# Patient Record
Sex: FEMALE | Race: BLACK OR AFRICAN AMERICAN | NOT HISPANIC OR LATINO | Employment: FULL TIME | URBAN - METROPOLITAN AREA
[De-identification: names, ages, dates, MRNs, and addresses within clinical notes are randomized per-mention and may not be internally consistent; named-entity substitution may affect disease eponyms.]

---

## 2023-08-04 LAB
CLUE CELLS: ABNORMAL
NUGENT SCORE: 1
YEAST: PRESENT

## 2023-09-01 LAB
CLUE CELLS: ABNORMAL
NUGENT SCORE: 0
TRICHOMONAS VAGINALIS: NEGATIVE
YEAST: PRESENT

## 2023-10-20 DIAGNOSIS — B37.9 YEAST INFECTION: ICD-10-CM

## 2023-10-20 RX ORDER — TERCONAZOLE 8 MG/G
1 CREAM VAGINAL NIGHTLY
Qty: 20 G | Refills: 0 | Status: SHIPPED | OUTPATIENT
Start: 2023-10-20 | End: 2023-12-15 | Stop reason: WASHOUT

## 2023-10-20 RX ORDER — TERCONAZOLE 8 MG/G
1 CREAM VAGINAL NIGHTLY
COMMUNITY
Start: 2023-08-04 | End: 2023-10-20 | Stop reason: SDUPTHER

## 2023-10-20 NOTE — TELEPHONE ENCOUNTER
Patient called requesting refill on Terconazole.     LRA 8-3-23 with PBU    Pharmacy [facility]

## 2023-12-11 ASSESSMENT — ENCOUNTER SYMPTOMS
NAUSEA: 0
HEADACHES: 0
CHILLS: 0
CONSTIPATION: 0
BACK PAIN: 0
DIARRHEA: 0
FREQUENCY: 0
DYSURIA: 0
FLANK PAIN: 0
ABDOMINAL PAIN: 0
ANOREXIA: 0
SORE THROAT: 0
VOMITING: 0
HEMATURIA: 0
FEVER: 0

## 2023-12-13 NOTE — PROGRESS NOTES
HPI    - PT HAS VAGINAL ITCHING, DISCHARGE - WHITE - USED TERCONAZOLE CREAM BUT DID NOT HELP  Last edited by Juan Morales MA on 12/15/2023  2:50 PM.        Patient treated for yeast with Diflucan in August and symptoms came back. Then did Terconazol and symptoms came back again. Did the cream 3-4 weeks ago. Came back a week after. Comes back when sexually active. Currently feels itching internal and external, discharge, white, clumpy.     Subjective   Patient ID: Bridgett Ag is a 33 y.o. female who presents for Vaginitis/Bacterial Vaginosis.      Review of Systems   Constitutional:  Negative for chills and fever.   HENT:  Negative for sore throat.    Gastrointestinal:  Negative for abdominal pain, constipation, diarrhea, nausea and vomiting.   Genitourinary:  Positive for urgency and vaginal discharge. Negative for dysuria, flank pain, frequency, hematuria and pelvic pain.   Musculoskeletal:  Negative for back pain.   Skin:  Negative for rash.   Neurological:  Negative for headaches.       Objective   Constitutional: Alert and in no acute distress. Well developed, well nourished.  Abdomen: soft nontender; no abdominal mass palpated, no organomegaly and no hernias.  Genitourinary: external genitalia: erythema around introitus, no inguinal lymphadenopathy, Bartholin's urethral and Chetek's glands: normal, urethra: normal and bladder: normal on palpation.  Vagina: white discharge.  Cervix: normal.  Uterus: normal.   Right adnexa/parametria: normal.  Left adnexa/parametria: normal.  Psychiatric: alert and oriented x 3, affect normal to patient baseline and mood appropriate.     Assessment/Plan   -MDL culture to specify type of yeast.  Will treat with Diflucan 150 mg and repeat in 3 days.  Vulvar handout given.  Check HBGAIC.

## 2023-12-15 ENCOUNTER — OFFICE VISIT (OUTPATIENT)
Dept: OBSTETRICS AND GYNECOLOGY | Facility: CLINIC | Age: 33
End: 2023-12-15
Payer: COMMERCIAL

## 2023-12-15 ENCOUNTER — LAB (OUTPATIENT)
Dept: LAB | Facility: LAB | Age: 33
End: 2023-12-15
Payer: COMMERCIAL

## 2023-12-15 VITALS
SYSTOLIC BLOOD PRESSURE: 128 MMHG | WEIGHT: 228 LBS | HEIGHT: 64 IN | BODY MASS INDEX: 38.93 KG/M2 | DIASTOLIC BLOOD PRESSURE: 80 MMHG

## 2023-12-15 DIAGNOSIS — B37.9 YEAST INFECTION: Primary | ICD-10-CM

## 2023-12-15 DIAGNOSIS — B37.9 YEAST INFECTION: ICD-10-CM

## 2023-12-15 LAB
EST. AVERAGE GLUCOSE BLD GHB EST-MCNC: 128 MG/DL
HBA1C MFR BLD: 6.1 %

## 2023-12-15 PROCEDURE — 1036F TOBACCO NON-USER: CPT | Performed by: OBSTETRICS & GYNECOLOGY

## 2023-12-15 PROCEDURE — 36415 COLL VENOUS BLD VENIPUNCTURE: CPT

## 2023-12-15 PROCEDURE — 83036 HEMOGLOBIN GLYCOSYLATED A1C: CPT

## 2023-12-15 PROCEDURE — 99213 OFFICE O/P EST LOW 20 MIN: CPT | Performed by: OBSTETRICS & GYNECOLOGY

## 2023-12-15 RX ORDER — FLUCONAZOLE 150 MG/1
150 TABLET ORAL ONCE
Qty: 2 TABLET | Refills: 0 | Status: SHIPPED | OUTPATIENT
Start: 2023-12-15 | End: 2023-12-15

## 2023-12-15 ASSESSMENT — ENCOUNTER SYMPTOMS
DYSURIA: 0
VOMITING: 0
FLANK PAIN: 0
ABDOMINAL PAIN: 0
BACK PAIN: 0
HEADACHES: 0
SORE THROAT: 0
DIARRHEA: 0
FREQUENCY: 0
FEVER: 0
HEMATURIA: 0
CONSTIPATION: 0
NAUSEA: 0
CHILLS: 0

## 2023-12-19 ENCOUNTER — TELEPHONE (OUTPATIENT)
Dept: OBSTETRICS AND GYNECOLOGY | Facility: CLINIC | Age: 33
End: 2023-12-19
Payer: COMMERCIAL

## 2023-12-20 NOTE — TELEPHONE ENCOUNTER
Patient called about increasing HGBAIC and recommend she follow up with a PCP. She is working on getting a new one.

## 2023-12-22 DIAGNOSIS — B96.89 BACTERIAL VAGINOSIS: Primary | ICD-10-CM

## 2023-12-22 DIAGNOSIS — B37.9 YEAST INFECTION: ICD-10-CM

## 2023-12-22 DIAGNOSIS — N76.0 BACTERIAL VAGINOSIS: Primary | ICD-10-CM

## 2023-12-22 RX ORDER — FLUCONAZOLE 150 MG/1
150 TABLET ORAL ONCE
Qty: 2 TABLET | Refills: 0 | Status: SHIPPED | OUTPATIENT
Start: 2023-12-22 | End: 2023-12-22

## 2023-12-22 RX ORDER — METRONIDAZOLE 7.5 MG/G
GEL VAGINAL DAILY
Qty: 70 G | Refills: 0 | Status: SHIPPED | OUTPATIENT
Start: 2023-12-22 | End: 2023-12-27

## 2024-04-08 ENCOUNTER — HOSPITAL ENCOUNTER (EMERGENCY)
Facility: HOSPITAL | Age: 34
Discharge: HOME | End: 2024-04-08
Payer: COMMERCIAL

## 2024-04-08 ENCOUNTER — APPOINTMENT (OUTPATIENT)
Dept: RADIOLOGY | Facility: HOSPITAL | Age: 34
End: 2024-04-08
Payer: COMMERCIAL

## 2024-04-08 VITALS
HEART RATE: 74 BPM | TEMPERATURE: 97.8 F | DIASTOLIC BLOOD PRESSURE: 93 MMHG | WEIGHT: 214 LBS | HEIGHT: 65 IN | BODY MASS INDEX: 35.65 KG/M2 | SYSTOLIC BLOOD PRESSURE: 140 MMHG | OXYGEN SATURATION: 98 % | RESPIRATION RATE: 18 BRPM

## 2024-04-08 DIAGNOSIS — M54.12 CERVICAL RADICULOPATHY: ICD-10-CM

## 2024-04-08 DIAGNOSIS — M25.511 ACUTE PAIN OF RIGHT SHOULDER: Primary | ICD-10-CM

## 2024-04-08 PROCEDURE — 73030 X-RAY EXAM OF SHOULDER: CPT | Mod: RIGHT SIDE | Performed by: RADIOLOGY

## 2024-04-08 PROCEDURE — 99283 EMERGENCY DEPT VISIT LOW MDM: CPT

## 2024-04-08 PROCEDURE — 73030 X-RAY EXAM OF SHOULDER: CPT | Mod: RT

## 2024-04-08 PROCEDURE — 2500000004 HC RX 250 GENERAL PHARMACY W/ HCPCS (ALT 636 FOR OP/ED): Performed by: PHYSICIAN ASSISTANT

## 2024-04-08 PROCEDURE — 96372 THER/PROPH/DIAG INJ SC/IM: CPT | Performed by: PHYSICIAN ASSISTANT

## 2024-04-08 RX ORDER — CYCLOBENZAPRINE HCL 10 MG
10 TABLET ORAL 3 TIMES DAILY PRN
Qty: 15 TABLET | Refills: 0 | Status: SHIPPED | OUTPATIENT
Start: 2024-04-08

## 2024-04-08 RX ORDER — NAPROXEN 500 MG/1
500 TABLET ORAL
Qty: 30 TABLET | Refills: 0 | Status: SHIPPED | OUTPATIENT
Start: 2024-04-08 | End: 2024-04-23

## 2024-04-08 RX ORDER — KETOROLAC TROMETHAMINE 30 MG/ML
30 INJECTION, SOLUTION INTRAMUSCULAR; INTRAVENOUS ONCE
Status: COMPLETED | OUTPATIENT
Start: 2024-04-08 | End: 2024-04-08

## 2024-04-08 RX ADMIN — KETOROLAC TROMETHAMINE 30 MG: 30 INJECTION, SOLUTION INTRAMUSCULAR at 17:34

## 2024-04-08 ASSESSMENT — PAIN SCALES - GENERAL: PAINLEVEL_OUTOF10: 8

## 2024-04-08 ASSESSMENT — PAIN DESCRIPTION - ORIENTATION: ORIENTATION: RIGHT

## 2024-04-08 ASSESSMENT — COLUMBIA-SUICIDE SEVERITY RATING SCALE - C-SSRS
2. HAVE YOU ACTUALLY HAD ANY THOUGHTS OF KILLING YOURSELF?: NO
6. HAVE YOU EVER DONE ANYTHING, STARTED TO DO ANYTHING, OR PREPARED TO DO ANYTHING TO END YOUR LIFE?: NO
1. IN THE PAST MONTH, HAVE YOU WISHED YOU WERE DEAD OR WISHED YOU COULD GO TO SLEEP AND NOT WAKE UP?: NO

## 2024-04-08 ASSESSMENT — PAIN DESCRIPTION - LOCATION: LOCATION: SHOULDER

## 2024-04-08 ASSESSMENT — PAIN - FUNCTIONAL ASSESSMENT: PAIN_FUNCTIONAL_ASSESSMENT: 0-10

## 2024-04-08 NOTE — ED TRIAGE NOTES
C/O RIGHT SHOULDER PAIN, UNKNOWN INJURY, HX OF DISLOCATION, DENIES CP/SOB/PALPITATIONS/N/V/D/F/CHILLS, INCREASED PAIN WITH ROM

## 2024-04-08 NOTE — ED PROVIDER NOTES
HPI   Chief Complaint   Patient presents with    Arm Pain    Arm Injury     RIGHT       Is a 33-year-old female with pain in her right shoulder also has some pain from her neck down towards her fingers this has been ongoing for the past day she is concerned about a dislocation of the right shoulder since she has dislocated her shoulder in the past however no injury no lifting turning or bending.  She is still able to shrug her shoulders.  She denies other injury nothing make her better or worse                          No data recorded                   Patient History   Past Medical History:   Diagnosis Date    Encounter for screening for malignant neoplasm of cervix 02/22/2021    Cervical cancer screening    Vitamin D deficiency, unspecified 01/12/2021    Vitamin D deficiency     Past Surgical History:   Procedure Laterality Date    OTHER SURGICAL HISTORY  03/01/2022    Loop electrosurgical excision procedure    OTHER SURGICAL HISTORY  03/01/2022    Colposcopy     Family History   Problem Relation Name Age of Onset    Hypertension Mother      Diabetes Mother      Blood clot Father      Heart disease Father      Diabetes Father       Social History     Tobacco Use    Smoking status: Never    Smokeless tobacco: Never   Substance Use Topics    Alcohol use: Never    Drug use: Never       Physical Exam   ED Triage Vitals [04/08/24 1738]   Temperature Heart Rate Respirations BP   36.6 °C (97.8 °F) 74 18 (!) 140/93      Pulse Ox Temp src Heart Rate Source Patient Position   98 % -- -- --      BP Location FiO2 (%)     -- --       Physical Exam  Vitals reviewed.   Constitutional:       General: She is not in acute distress.     Appearance: Normal appearance. She is normal weight. She is not ill-appearing, toxic-appearing or diaphoretic.   HENT:      Head: Normocephalic and atraumatic.      Right Ear: External ear normal.      Left Ear: External ear normal.      Nose: Nose normal.      Mouth/Throat:      Mouth: Mucous  membranes are moist.   Eyes:      Extraocular Movements: Extraocular movements intact.      Conjunctiva/sclera: Conjunctivae normal.      Pupils: Pupils are equal, round, and reactive to light.   Pulmonary:      Effort: Pulmonary effort is normal. No respiratory distress.      Breath sounds: No stridor.   Abdominal:      General: There is no distension.      Tenderness: There is no guarding or rebound.   Musculoskeletal:         General: Tenderness present. No swelling or deformity.      Cervical back: Normal range of motion.      Comments: Tenderness in the right shoulder region.   Skin:     Capillary Refill: Capillary refill takes less than 2 seconds.      Findings: No rash.   Neurological:      General: No focal deficit present.      Mental Status: She is alert and oriented to person, place, and time. Mental status is at baseline.      Sensory: No sensory deficit.      Motor: No weakness.      Coordination: Coordination normal.   Psychiatric:         Mood and Affect: Mood normal.         Behavior: Behavior normal.         Thought Content: Thought content normal.         Judgment: Judgment normal.         ED Course & MDM   Diagnoses as of 04/08/24 1813   Acute pain of right shoulder   Cervical radiculopathy       Medical Decision Making  Differential is sprain versus strain vs dislocation (clinically do not suspect)     Ultimately patient is discharged home x-rays unremarkable for fx or dislocation    Referred to ortho discharged.        Procedure  Procedures     Stalin Murcia PA-C  04/08/24 1818

## 2024-04-22 ENCOUNTER — PROCEDURE VISIT (OUTPATIENT)
Dept: ORTHOPEDIC SURGERY | Facility: CLINIC | Age: 34
End: 2024-04-22
Payer: COMMERCIAL

## 2024-04-22 DIAGNOSIS — M77.8 TENDINITIS OF RIGHT SHOULDER: ICD-10-CM

## 2024-04-22 PROCEDURE — 99213 OFFICE O/P EST LOW 20 MIN: CPT | Performed by: ORTHOPAEDIC SURGERY

## 2024-04-22 PROCEDURE — 99203 OFFICE O/P NEW LOW 30 MIN: CPT | Performed by: ORTHOPAEDIC SURGERY

## 2024-04-22 ASSESSMENT — PAIN SCALES - GENERAL: PAINLEVEL_OUTOF10: 8

## 2024-04-22 ASSESSMENT — PAIN - FUNCTIONAL ASSESSMENT: PAIN_FUNCTIONAL_ASSESSMENT: 0-10

## 2024-04-22 NOTE — PROGRESS NOTES
2-week history right shoulder pain no injury but better went to ER placed in sling also has developed some lower back pain  X-rays are negative recommend physical therapy for shoulder and back

## 2024-06-03 ENCOUNTER — EVALUATION (OUTPATIENT)
Dept: PHYSICAL THERAPY | Facility: HOSPITAL | Age: 34
End: 2024-06-03
Payer: COMMERCIAL

## 2024-06-03 DIAGNOSIS — M77.8 TENDINITIS OF RIGHT SHOULDER: ICD-10-CM

## 2024-06-03 PROCEDURE — 97110 THERAPEUTIC EXERCISES: CPT | Mod: GP | Performed by: PHYSICAL THERAPIST

## 2024-06-03 PROCEDURE — 97161 PT EVAL LOW COMPLEX 20 MIN: CPT | Mod: GP | Performed by: PHYSICAL THERAPIST

## 2024-06-03 NOTE — PROGRESS NOTES
Initial evaluation  Physical Therapy Initial Evaluation    Patient Name:Bridgett Ag  MRN:[id number]  Today's Date:6/3/2024  Referred by: Saqib Kelly  Time Calculation  Start Time: 1637  Stop Time: 1710  Time Calculation (min): 33 min    Therapy Diagnosis  1. Tendinitis of right shoulder         Plan of Care  Planned interventions include PRN: therapeutic exercise, manual therapy, electrical stimulation, hot pack, vasopneumatic device with cold, HEP training.   Frequency and duration: 1 time(s) a week, for  6-8 weeks or 8 visits .   Plan of care was developed with input and agreement by the patient.   Plan for next session: review HEP, begin scapular stabilization and continue posture re-education, cervical spine stretching to be added next visit    Assessment  Problem List: Pain, range of motion/joint mobility, strength, activity limitations, ADLs/IADLs/self care skills, decreased functional level, decreased knowledge of HEP, flexibility, and posture.     Patient is a 34 y.o. female who presents with complaint of R shoulder pain . Standardized testing and measures administered today reveal that the patient has multiple impairments in body structures and functions, activity limitations, and participation restrictions. These include subjective and objective findings such as pain, tenderness to palpation of the affected area, decreased ROM, strength, flexibility, and function. The patient's impairments are likely influenced by mechanical dysfunction and deconditioning with possible overuse and degenerative changes. Skilled PT services are warranted in order to realize measurable and meaningful change in the above outcome measures and achieve improvements in the patient's functional status and individual goals.    Rehab Potential:good  Clinical Presentation: Stable and/or uncomplicated characteristics.   Evaluation Complexity: Low      Precautions/Fall Risk:none* Pacemaker no  Seizures No  Post Op  Movement/Restrictions No    Insurance  Visit number: 1   Approved number of visits: MN  Onset Date: 2024  Certification Period:  Beginnin/3/2024             Payor: [employer] / Plan: HIGHMARK / Product Type: *No Product type* /     Subjective  Chief complaint/reason for visit: Patient is a 34 year old female with insidious onset R shoulder pain starting after waking up in 2024. She has no prior shoulder complaints/injuries. She is RHD. She has had x-rays. Nothing really relieves or worsens her pain except sleeping, sometimes typing, and OH motions.  Mechanism of Injury:  unknown and slept funny  Location of Pain: Anterior/lateral shoulder  Current Pain Level (0-10): 2   High Pain Level (0-10): 5   Low Pain Level (0-10): 0  Pain Quality: aching, dull, and sharp  Pain Exacerbating Factors: resting, lying down, typing at work  Pain Relieving Factors: heat, medications prescribed pain medications, and position change    Medical Screening: Reviewed medical history form with patient and medical screening assessed.   Red Flags: Do you have any of the following? No  Fever/chills, unexplained weight changes, dizziness/fainting, unexplained change in bowel or bladder functions, unexplained malaise or muscle weakness, night pain/sweats, numbness or tingling  Current Medical Management: Medical, X-rays  Prior Level of Function (PLOF)  Patient previously independent with all ADLs  Exercise/Physical Activity: None  Functional limitations: decreased positional tolerances with sleeping,reaching OH to end range, self-care activities, work related tasks (typing), push/pull activities, participation in home management/duties, participation in leisure activities and athletics.  Work Status: full time job doing [employer] seated job  Current Status: improved slightly  Patient Awareness: Patient is aware of  her diagnosis and prognosis.   Living Environment: house  Social Support: spouse / significant other  Personal Factors That  May Impact Care: none  Patient's Goal for Treatment: relieving pain, increasing strength, increasing mobility, improving positional tolerances, reducing symptoms, returning to work symptom free,  and resuming physical activities .     Objective  Other Measures  Disability of Arm Shoulder Hand (DASH): 15.9%     Upper Extremity Objective:  Observation/Posture: FHRS posture, patient is obese, large busted  Palpation: No TTP  AROM (tested in supine):   R/L shoulder flexion AROM (degrees): WFL pain at end range  R/L shoulder extension AROM (degrees): WFL  R/L shoulder abduction AROM (degrees): 160 degpainful arc and end range pain  R/L shoulder ER AROM (degrees): WFL  R/L shoulder IR AROM (degrees): WFL  R/L elbow AROM (degrees): WFL  MMT:   R/L shoulder flexion strength (MMT): 4/5 with pain above elbow joint // 4+/5  R/L shoulder abduction strength (MMT): 4+/5  R/L shoulder ER strength (MMT): 4+/5  R/L shoulder IR strength (MMT): 4+/5  R/L elbow flex/ext (MMT): 5/5    Special Tests: (+) Love, (+) Painful Arc, (+) Neer's, (-) Drop Arm, FIR WNL    Treatment Performed Today: Initial evaluation and patient education regarding diagnosis, prognosis, contributing factors, comorbidities, importance and instruction of HEP, role of PT, postural re-education, activity modification, and body mechanics.    Therapeutic Exercise 10 minutes  Education/Resources provided today: Home Program   [email or web address]: Provided, reviewed, and performed the following therapeutic exercises with the patient:  Access Code: [email or web address]  URL: [email or web address]  Date: 06/03/2024  Prepared by: Sana Mims    Exercises  - Seated Scapular Retraction  - 1 x daily - 7 x weekly - 3 sets - 10 reps - 3 hold  - Doorway Pec Stretch at 90 Degrees Abduction  - 1 x daily - 7 x weekly - 3 reps - 30 hold  - Sidelying Open Book Thoracic Lumbar Rotation and Extension  - 1 x daily - 7 x weekly - 10 reps - 10 hold  - Open Book Chest Stretch on  Towel Roll  - 1 x daily - 7 x weekly - 10 reps - 10 hold    Response to Treatment: improved knowledge and understanding of condition, decreased pain, improved joint mobility/ROM, improved strength, improved flexibility, improved tissue mobility, improved posture.    Goals: Goals set and discussed today.   Upper Extremity Goals  At time of discharge, the patient will:  1) Perform HEP independently within the clinic to show adherence to initial program for symptom management.  2) Patient will report a reduction in pain at its worst from 8/10 to 0/10 to allow patient to perform essential ADLs and iADLs at [facility].  3) Demonstrate proper scapular position and posture for improved positional tolerances to sitting and standing > 2 hrs symptom free while typing at work  4) Decrease score of Quick Dash by > 8 points to meet MCID, demonstrating a clinically significant improvement in function.  5) Patient will tolerate laying on affected shoulder with unrestricted sleep for 6-8 hours.  8) Patient will demonstrate functional strength of the affected shoulder to perform lift of 8-12 pounds for groceries and laundry duties.     Patient stated goal:

## 2024-06-24 ENCOUNTER — TREATMENT (OUTPATIENT)
Dept: PHYSICAL THERAPY | Facility: HOSPITAL | Age: 34
End: 2024-06-24
Payer: COMMERCIAL

## 2024-06-24 DIAGNOSIS — M77.8 TENDINITIS OF RIGHT SHOULDER: ICD-10-CM

## 2024-06-24 PROCEDURE — 97110 THERAPEUTIC EXERCISES: CPT | Mod: GP | Performed by: PHYSICAL THERAPIST

## 2024-06-24 NOTE — PROGRESS NOTES
"Treatment note  Physical Therapy Treatment  Patient Name:Bridgett Ag  MRN:[id number]  Today's Date:2024  Referred by: Saqib Kelyl  Time Calculation  Start Time: 1655  Stop Time: 1740  Time Calculation (min): 45 min    Therapy Diagnosis  1. Tendinitis of right shoulder         Assessment: Patient requires visual and verbal cuing for instruction of exercises. She has no complaints of pain during or following exercises. Reviewed HEP and progress scapular strengthening and T/S mobility today. Focus of treatment on scapular strengthening and posture.    Plan: Follow up 1 visit  in 2-3 weeks and plan for early discharge to independent HEP if patient remains pain free    Insurance  Visit number: 2  Approved number of visits: MN  Onset Date: 2024  Certification Period:  Beginnin/3/2024    Ending:     Precautions/Fall Risk:none* Pacemaker no  Seizures No  Post Op Movement/Restrictions No    Subjective/Pain: Patient reports only hurting \"now and then\" since initial evaluation stating 80% of the time she's been pain free since last seen. She reports some compliance with HEP.  Current Pain Level (0-10): 0    HEP Compliance: Fair    Objective/Outcome Measures:  Posture: FHRS posture  R shoulder AROM WNL    Treatment  Therapeutic Exercise 45 minutes  UE ergo: 3'/3'  Pec s: 1' x 2   Wall walks 3\" holds x 10   TB rows: Green: 3 x10   Jobes: 1# 2 x 10   T/S rotation: 10\" x 10   SL 4 way 2# x 20 ea  TB \"No moneys\" green: 3 x 10  TB green Chest pulls: 2 x 10     Goals:  Upper Extremity Goals  At time of discharge, the patient will:  1) Perform HEP independently within the clinic to show adherence to initial program for symptom management.  2) Patient will report a reduction in pain at its worst from 8/10 to 0/10 to allow patient to perform essential ADLs and iADLs at [facility].  3) Demonstrate proper scapular position and posture for improved positional tolerances to sitting and standing > 2 hrs symptom free " while typing at work  4) Decrease score of Quick Dash by > 8 points to meet MCID, demonstrating a clinically significant improvement in function.  5) Patient will tolerate laying on affected shoulder with unrestricted sleep for 6-8 hours.  8) Patient will demonstrate functional strength of the affected shoulder to perform lift of 8-12 pounds for groceries and laundry duties.     Patient stated goal: return to PLOF

## 2024-07-01 ENCOUNTER — APPOINTMENT (OUTPATIENT)
Dept: PHYSICAL THERAPY | Facility: HOSPITAL | Age: 34
End: 2024-07-01
Payer: COMMERCIAL

## 2024-08-18 NOTE — PROGRESS NOTES
Subjective   Patient ID: Bridgett Ag is a 34 y.o. female who presents for Annual Exam.    PAP 8-3-23 NEG NEG; 7-25-22 ASCUS, HPV POS 18 - Had OB colpo. Gardasil done. Has had colposcopies in past.  MAMMO NEVER  DEXA NEVER  COLON NEVER  Gardasil yes     No yeast infections past year.     20 week loss 2022. Had f/u with Dr. Nguyen. MFM consult next pregnancy and 12-13 week cerclage. Trying for pregnancy over a year. Taking folic acid. Trouble getting pregnant the first time. Saw [facility]. Got pregnant on own. He did a normal SA.      Regular cycles. Every 28-29 days.    Bought a house with renovations. Lots of stress with it.      Review of Systems   All other systems reviewed and are negative.      Objective   Constitutional: Alert and in no acute distress. Well developed, well nourished.  Neck: no neck asymmetry. Supple and thyroid not enlarged and there were no palpable thyroid nodules.  Chest: Breasts normal appearance, no nipple discharge and no skin changes and palpation of breasts and axillae: no palpable mass and no axillary lymphadenopathy.  Abdomen: soft nontender; no abdominal mass palpated, no organomegaly and no hernias.  Genitourinary: external genitalia: normal, no inguinal lymphadenopathy, Bartholin's urethral and Hendley's glands: normal, urethra: normal and bladder: normal on palpation.  Vagina: normal.  Cervix: normal. Mucous.  Uterus: normal.   Right adnexa/parametria: normal.  Left adnexa/parametria: normal.  Skin: normal skin color and pigmentation, normal skin turgor and no rash.  Psychiatric: alert and oriented x 3, affect normal to patient baseline and mood appropriate.     Assessment/Plan   -Pap-HPV  -screening bloodwork. TSH, lipids, CMP, CBC, HGAIC.  -Discussed ovulation testing. Discussed HSG, call with first day of period.  with normal SA a couple years ago. Discussed ovulation, patient was waiting. Will place referral to [facility] because of long wait. Discussed weight loss.

## 2024-08-19 ENCOUNTER — APPOINTMENT (OUTPATIENT)
Dept: OBSTETRICS AND GYNECOLOGY | Facility: CLINIC | Age: 34
End: 2024-08-19
Payer: COMMERCIAL

## 2024-08-19 VITALS
HEIGHT: 65 IN | WEIGHT: 230 LBS | SYSTOLIC BLOOD PRESSURE: 110 MMHG | BODY MASS INDEX: 38.32 KG/M2 | DIASTOLIC BLOOD PRESSURE: 70 MMHG

## 2024-08-19 DIAGNOSIS — Z12.4 CERVICAL CANCER SCREENING: ICD-10-CM

## 2024-08-19 DIAGNOSIS — N97.9 INFERTILITY, FEMALE: ICD-10-CM

## 2024-08-19 DIAGNOSIS — Z01.419 WELL WOMAN EXAM WITH ROUTINE GYNECOLOGICAL EXAM: Primary | ICD-10-CM

## 2024-08-19 DIAGNOSIS — Z00.00 HEALTHCARE MAINTENANCE: ICD-10-CM

## 2024-08-19 PROCEDURE — 87624 HPV HI-RISK TYP POOLED RSLT: CPT

## 2024-08-19 PROCEDURE — 1036F TOBACCO NON-USER: CPT | Performed by: OBSTETRICS & GYNECOLOGY

## 2024-08-19 PROCEDURE — 3008F BODY MASS INDEX DOCD: CPT | Performed by: OBSTETRICS & GYNECOLOGY

## 2024-08-19 PROCEDURE — 88175 CYTOPATH C/V AUTO FLUID REDO: CPT

## 2024-08-19 PROCEDURE — 99395 PREV VISIT EST AGE 18-39: CPT | Performed by: OBSTETRICS & GYNECOLOGY

## 2024-08-19 RX ORDER — GLUC/MSM/COLGN2/HYAL/ANTIARTH3 375-375-20
TABLET ORAL
COMMUNITY

## 2024-08-19 RX ORDER — FOLIC ACID 0.4 MG
TABLET ORAL
COMMUNITY

## 2024-08-19 RX ORDER — ASCORBIC ACID 500 MG
TABLET ORAL
COMMUNITY

## 2024-08-19 RX ORDER — MULTIVITAMIN
TABLET ORAL
COMMUNITY

## 2024-08-19 RX ORDER — PNV NO.95/FERROUS FUM/FOLIC AC 28MG-0.8MG
TABLET ORAL
COMMUNITY

## 2024-12-16 ENCOUNTER — APPOINTMENT (OUTPATIENT)
Dept: CARDIOLOGY | Facility: HOSPITAL | Age: 34
End: 2024-12-16
Payer: COMMERCIAL

## 2024-12-16 ENCOUNTER — HOSPITAL ENCOUNTER (EMERGENCY)
Facility: HOSPITAL | Age: 34
Discharge: HOME | End: 2024-12-16
Attending: EMERGENCY MEDICINE
Payer: COMMERCIAL

## 2024-12-16 ENCOUNTER — APPOINTMENT (OUTPATIENT)
Dept: RADIOLOGY | Facility: HOSPITAL | Age: 34
End: 2024-12-16
Payer: COMMERCIAL

## 2024-12-16 VITALS
BODY MASS INDEX: 35.65 KG/M2 | WEIGHT: 214 LBS | HEART RATE: 77 BPM | OXYGEN SATURATION: 97 % | DIASTOLIC BLOOD PRESSURE: 96 MMHG | SYSTOLIC BLOOD PRESSURE: 123 MMHG | TEMPERATURE: 97.8 F | HEIGHT: 65 IN | RESPIRATION RATE: 16 BRPM

## 2024-12-16 DIAGNOSIS — R07.9 CHEST PAIN, UNSPECIFIED TYPE: Primary | ICD-10-CM

## 2024-12-16 LAB
ALBUMIN SERPL BCP-MCNC: 3.5 G/DL (ref 3.4–5)
ALP SERPL-CCNC: 81 U/L (ref 33–110)
ALT SERPL W P-5'-P-CCNC: 15 U/L (ref 7–45)
ANION GAP SERPL CALC-SCNC: 11 MMOL/L (ref 10–20)
APPEARANCE UR: CLEAR
AST SERPL W P-5'-P-CCNC: 14 U/L (ref 9–39)
B-HCG SERPL-ACNC: <2 MIU/ML
BACTERIA #/AREA URNS AUTO: ABNORMAL /HPF
BASOPHILS # BLD AUTO: 0.04 X10*3/UL (ref 0–0.1)
BASOPHILS NFR BLD AUTO: 0.3 %
BILIRUB SERPL-MCNC: 0.5 MG/DL (ref 0–1.2)
BILIRUB UR STRIP.AUTO-MCNC: NEGATIVE MG/DL
BUN SERPL-MCNC: 13 MG/DL (ref 6–23)
CALCIUM SERPL-MCNC: 9 MG/DL (ref 8.6–10.3)
CARDIAC TROPONIN I PNL SERPL HS: <3 NG/L (ref 0–13)
CARDIAC TROPONIN I PNL SERPL HS: <3 NG/L (ref 0–13)
CHLORIDE SERPL-SCNC: 104 MMOL/L (ref 98–107)
CO2 SERPL-SCNC: 26 MMOL/L (ref 21–32)
COLOR UR: YELLOW
CREAT SERPL-MCNC: 0.85 MG/DL (ref 0.5–1.05)
D DIMER PPP FEU-MCNC: 784 NG/ML FEU
EGFRCR SERPLBLD CKD-EPI 2021: >90 ML/MIN/1.73M*2
EOSINOPHIL # BLD AUTO: 0.45 X10*3/UL (ref 0–0.7)
EOSINOPHIL NFR BLD AUTO: 3.5 %
ERYTHROCYTE [DISTWIDTH] IN BLOOD BY AUTOMATED COUNT: 13.7 % (ref 11.5–14.5)
GLUCOSE SERPL-MCNC: 94 MG/DL (ref 74–99)
GLUCOSE UR STRIP.AUTO-MCNC: NORMAL MG/DL
HCG UR QL IA.RAPID: NEGATIVE
HCT VFR BLD AUTO: 43.4 % (ref 36–46)
HGB BLD-MCNC: 13.9 G/DL (ref 12–16)
IMM GRANULOCYTES # BLD AUTO: 0.02 X10*3/UL (ref 0–0.7)
IMM GRANULOCYTES NFR BLD AUTO: 0.2 % (ref 0–0.9)
KETONES UR STRIP.AUTO-MCNC: ABNORMAL MG/DL
LEUKOCYTE ESTERASE UR QL STRIP.AUTO: NEGATIVE
LYMPHOCYTES # BLD AUTO: 4.53 X10*3/UL (ref 1.2–4.8)
LYMPHOCYTES NFR BLD AUTO: 34.8 %
MCH RBC QN AUTO: 26.5 PG (ref 26–34)
MCHC RBC AUTO-ENTMCNC: 32 G/DL (ref 32–36)
MCV RBC AUTO: 83 FL (ref 80–100)
MONOCYTES # BLD AUTO: 0.85 X10*3/UL (ref 0.1–1)
MONOCYTES NFR BLD AUTO: 6.5 %
MUCOUS THREADS #/AREA URNS AUTO: ABNORMAL /LPF
NEUTROPHILS # BLD AUTO: 7.14 X10*3/UL (ref 1.2–7.7)
NEUTROPHILS NFR BLD AUTO: 54.7 %
NITRITE UR QL STRIP.AUTO: NEGATIVE
NRBC BLD-RTO: 0 /100 WBCS (ref 0–0)
PH UR STRIP.AUTO: 5.5 [PH]
PLATELET # BLD AUTO: 422 X10*3/UL (ref 150–450)
POTASSIUM SERPL-SCNC: 4.1 MMOL/L (ref 3.5–5.3)
PROT SERPL-MCNC: 8.4 G/DL (ref 6.4–8.2)
PROT UR STRIP.AUTO-MCNC: ABNORMAL MG/DL
RBC # BLD AUTO: 5.24 X10*6/UL (ref 4–5.2)
RBC # UR STRIP.AUTO: ABNORMAL /UL
RBC #/AREA URNS AUTO: ABNORMAL /HPF
SODIUM SERPL-SCNC: 137 MMOL/L (ref 136–145)
SP GR UR STRIP.AUTO: >1.05
SQUAMOUS #/AREA URNS AUTO: ABNORMAL /HPF
UROBILINOGEN UR STRIP.AUTO-MCNC: ABNORMAL MG/DL
WBC # BLD AUTO: 13 X10*3/UL (ref 4.4–11.3)
WBC #/AREA URNS AUTO: ABNORMAL /HPF

## 2024-12-16 PROCEDURE — 81025 URINE PREGNANCY TEST: CPT | Performed by: NURSE PRACTITIONER

## 2024-12-16 PROCEDURE — 80053 COMPREHEN METABOLIC PANEL: CPT | Performed by: NURSE PRACTITIONER

## 2024-12-16 PROCEDURE — 85379 FIBRIN DEGRADATION QUANT: CPT | Performed by: NURSE PRACTITIONER

## 2024-12-16 PROCEDURE — 93005 ELECTROCARDIOGRAM TRACING: CPT

## 2024-12-16 PROCEDURE — 96375 TX/PRO/DX INJ NEW DRUG ADDON: CPT

## 2024-12-16 PROCEDURE — 99285 EMERGENCY DEPT VISIT HI MDM: CPT | Mod: 25 | Performed by: EMERGENCY MEDICINE

## 2024-12-16 PROCEDURE — 71275 CT ANGIOGRAPHY CHEST: CPT

## 2024-12-16 PROCEDURE — 84484 ASSAY OF TROPONIN QUANT: CPT | Performed by: NURSE PRACTITIONER

## 2024-12-16 PROCEDURE — 36415 COLL VENOUS BLD VENIPUNCTURE: CPT | Performed by: NURSE PRACTITIONER

## 2024-12-16 PROCEDURE — 2500000004 HC RX 250 GENERAL PHARMACY W/ HCPCS (ALT 636 FOR OP/ED): Performed by: EMERGENCY MEDICINE

## 2024-12-16 PROCEDURE — 71275 CT ANGIOGRAPHY CHEST: CPT | Performed by: RADIOLOGY

## 2024-12-16 PROCEDURE — 96374 THER/PROPH/DIAG INJ IV PUSH: CPT | Mod: 59

## 2024-12-16 PROCEDURE — 84702 CHORIONIC GONADOTROPIN TEST: CPT | Performed by: NURSE PRACTITIONER

## 2024-12-16 PROCEDURE — 85025 COMPLETE CBC W/AUTO DIFF WBC: CPT | Performed by: NURSE PRACTITIONER

## 2024-12-16 PROCEDURE — 81001 URINALYSIS AUTO W/SCOPE: CPT | Performed by: NURSE PRACTITIONER

## 2024-12-16 PROCEDURE — 71045 X-RAY EXAM CHEST 1 VIEW: CPT | Performed by: RADIOLOGY

## 2024-12-16 PROCEDURE — 71045 X-RAY EXAM CHEST 1 VIEW: CPT

## 2024-12-16 PROCEDURE — 2550000001 HC RX 255 CONTRASTS: Performed by: NURSE PRACTITIONER

## 2024-12-16 RX ORDER — KETOROLAC TROMETHAMINE 30 MG/ML
30 INJECTION, SOLUTION INTRAMUSCULAR; INTRAVENOUS ONCE
Status: COMPLETED | OUTPATIENT
Start: 2024-12-16 | End: 2024-12-16

## 2024-12-16 RX ORDER — ONDANSETRON HYDROCHLORIDE 2 MG/ML
4 INJECTION, SOLUTION INTRAVENOUS ONCE
Status: COMPLETED | OUTPATIENT
Start: 2024-12-16 | End: 2024-12-16

## 2024-12-16 ASSESSMENT — PAIN SCALES - GENERAL
PAINLEVEL_OUTOF10: 1
PAINLEVEL_OUTOF10: 5 - MODERATE PAIN
PAINLEVEL_OUTOF10: 1
PAINLEVEL_OUTOF10: 9

## 2024-12-16 ASSESSMENT — PAIN DESCRIPTION - DESCRIPTORS
DESCRIPTORS: ACHING

## 2024-12-16 ASSESSMENT — PAIN - FUNCTIONAL ASSESSMENT
PAIN_FUNCTIONAL_ASSESSMENT: 0-10
PAIN_FUNCTIONAL_ASSESSMENT: 0-10

## 2024-12-16 ASSESSMENT — PAIN DESCRIPTION - ONSET: ONSET: GRADUAL

## 2024-12-16 ASSESSMENT — PAIN DESCRIPTION - ORIENTATION
ORIENTATION: LOWER
ORIENTATION: RIGHT

## 2024-12-16 ASSESSMENT — PAIN DESCRIPTION - PROGRESSION: CLINICAL_PROGRESSION: NOT CHANGED

## 2024-12-16 ASSESSMENT — PAIN DESCRIPTION - FREQUENCY
FREQUENCY: CONSTANT/CONTINUOUS
FREQUENCY: CONSTANT/CONTINUOUS

## 2024-12-16 ASSESSMENT — COLUMBIA-SUICIDE SEVERITY RATING SCALE - C-SSRS
1. IN THE PAST MONTH, HAVE YOU WISHED YOU WERE DEAD OR WISHED YOU COULD GO TO SLEEP AND NOT WAKE UP?: NO
2. HAVE YOU ACTUALLY HAD ANY THOUGHTS OF KILLING YOURSELF?: NO
6. HAVE YOU EVER DONE ANYTHING, STARTED TO DO ANYTHING, OR PREPARED TO DO ANYTHING TO END YOUR LIFE?: NO

## 2024-12-16 ASSESSMENT — PAIN DESCRIPTION - LOCATION
LOCATION: CHEST
LOCATION: BACK

## 2024-12-16 ASSESSMENT — PAIN DESCRIPTION - PAIN TYPE: TYPE: ACUTE PAIN

## 2024-12-16 NOTE — ED TRIAGE NOTES
Patient presents to ED from home for right sided chest pain, mild SOB, dizziness, and mid back pain since last night. Patient recently returned from [place], [place] on 12/4/2024.

## 2024-12-16 NOTE — ED TRIAGE NOTES
This patient was seen in triage.     Vitals are noted.      HPI:  Patient is a 34-year-old female with no significant past medical history presenting to the emergency department with right-sided chest pain, shortness of breath, lightheadedness, dizziness, back pain since last night, recently returned from [facility] on December 4.  Denies any unilateral leg pain, swelling.  Denies any oral contraceptive use.  Denies any history of PE or DVT.  Denies any mopped assist.  Does have family history of cardiac disease, father with a triple bypass at age 54.    Focused PE:  Gen: Well-appearing, not in acute distress  Cardiovascular: Regular rate, normal rhythm, no murmur, no gallop  Respiratory: No adventitious lung sounds auscultated.  Abdomen: No reproducible abdominal tenderness upon palpation,  physical exam may be limited by patient positioning sitting up in a chair  Neuro:  Alert and Oriented, speech clear and coherent     Plan:  IV, lab work, EKG, imaging        For the remainder of the patient's workup and ED course, please see the main ED provider note.  We discussed need for diagnostic testing including lab studies and imaging.  We also discussed that they may be asked to wait in the waiting room while these test are pending.  They understand that if they choose to leave without having the testing completed or resulted that we cannot rule out acute life-threatening illnesses and the risks involved to lead to worsening condition, permanent disability or even death.

## 2024-12-17 LAB
ATRIAL RATE: 78 BPM
P AXIS: 22 DEGREES
P OFFSET: 175 MS
P ONSET: 126 MS
PR INTERVAL: 178 MS
Q ONSET: 215 MS
QRS COUNT: 13 BEATS
QRS DURATION: 74 MS
QT INTERVAL: 388 MS
QTC CALCULATION(BAZETT): 442 MS
QTC FREDERICIA: 423 MS
R AXIS: -11 DEGREES
T AXIS: 5 DEGREES
T OFFSET: 409 MS
VENTRICULAR RATE: 78 BPM

## 2024-12-17 NOTE — ED PROVIDER NOTES
HPI   Chief Complaint   Patient presents with    Chest Pain       HPI  Patient was at the 24 hours of chest pain.  She says she has abrasion to left shoulder into her back.  She denies any fever, cough, vomiting.  He does have some nausea with some decreased oral intake.  Normally to big breakfast with did not have a large breakfast today because of her nausea.  Ate some lunch.  She denies any changes with food.  He states that sitting up in sitting forward sometimes makes her pain, and her feel worse.  She describes her pain in the right chest underneath her breast as well as in her right shoulder.      Patient History   Past Medical History:   Diagnosis Date    Encounter for screening for malignant neoplasm of cervix 02/22/2021    Cervical cancer screening    Vitamin D deficiency, unspecified 01/12/2021    Vitamin D deficiency     Past Surgical History:   Procedure Laterality Date    OTHER SURGICAL HISTORY  03/01/2022    Loop electrosurgical excision procedure    OTHER SURGICAL HISTORY  03/01/2022    Colposcopy     Family History   Problem Relation Name Age of Onset    Hypertension Mother      Diabetes Mother      Blood clot Father      Heart disease Father      Diabetes Father       Social History     Tobacco Use    Smoking status: Never    Smokeless tobacco: Never   Vaping Use    Vaping status: Never Used   Substance Use Topics    Alcohol use: Never    Drug use: Never       Physical Exam   ED Triage Vitals [12/16/24 1717]   Temperature Heart Rate Respirations BP   36.8 °C (98.2 °F) 84 18 112/82      Pulse Ox Temp Source Heart Rate Source Patient Position   98 % Temporal Monitor Sitting      BP Location FiO2 (%)     Left arm --       Physical Exam  Vitals and nursing note reviewed.   Constitutional:       General: She is not in acute distress.     Appearance: She is well-developed.   HENT:      Head: Normocephalic and atraumatic.   Eyes:      Conjunctiva/sclera: Conjunctivae normal.   Cardiovascular:      Rate  and Rhythm: Normal rate and regular rhythm.      Heart sounds: No murmur heard.  Pulmonary:      Effort: Pulmonary effort is normal. No respiratory distress.      Breath sounds: Normal breath sounds.   Abdominal:      Palpations: Abdomen is soft.      Tenderness: There is no abdominal tenderness.   Musculoskeletal:         General: No swelling.      Cervical back: Neck supple.   Skin:     General: Skin is warm and dry.      Capillary Refill: Capillary refill takes less than 2 seconds.   Neurological:      Mental Status: She is alert.   Psychiatric:         Mood and Affect: Mood normal.           ED Course & MDM   Diagnoses as of 12/16/24 2220   Chest pain, unspecified type                 No data recorded     Manda Coma Scale Score: 15 (12/16/24 1721 : Niesha Cisneros RN)                           Medical Decision Making  The patient does have reproducible pain with movement.  Suspect more muscle skeletal.  The patient's D-dimer is elevated but PE study is negative.  LFTs are normal.  Did consider biliary pathology but no active signs of obstruction at this time.  Did give Toradol and Zofran.  The patient does want to try eating here.  With negative troponin test feel patient be discharged home follow-up with PCP.    EKG interpreted by myself.  Normal sinus rhythm at a rate of 78 bpm.  Normal intervals.  Normal axis.  No signs of acute ischemia.    Procedure  Procedures     Nando Rdz MD  12/16/24 2220

## 2025-01-17 ENCOUNTER — APPOINTMENT (OUTPATIENT)
Dept: PRIMARY CARE | Facility: CLINIC | Age: 35
End: 2025-01-17
Payer: COMMERCIAL

## 2025-01-20 ENCOUNTER — APPOINTMENT (OUTPATIENT)
Dept: PRIMARY CARE | Facility: CLINIC | Age: 35
End: 2025-01-20
Payer: COMMERCIAL

## 2025-01-20 VITALS
DIASTOLIC BLOOD PRESSURE: 72 MMHG | HEART RATE: 83 BPM | SYSTOLIC BLOOD PRESSURE: 118 MMHG | WEIGHT: 238 LBS | OXYGEN SATURATION: 96 % | HEIGHT: 65 IN | TEMPERATURE: 97.2 F | BODY MASS INDEX: 39.65 KG/M2

## 2025-01-20 DIAGNOSIS — Z00.00 ANNUAL PHYSICAL EXAM: Primary | ICD-10-CM

## 2025-01-20 DIAGNOSIS — Z76.89 NEED FOR REFERRAL TO NUTRITIONAL SERVICES: ICD-10-CM

## 2025-01-20 DIAGNOSIS — R91.8 MULTIPLE LUNG NODULES ON CT: ICD-10-CM

## 2025-01-20 DIAGNOSIS — E56.9 VITAMIN DEFICIENCY: ICD-10-CM

## 2025-01-20 DIAGNOSIS — Z01.82 ENCOUNTER FOR ALLERGY TESTING: ICD-10-CM

## 2025-01-20 PROCEDURE — 99203 OFFICE O/P NEW LOW 30 MIN: CPT | Performed by: NURSE PRACTITIONER

## 2025-01-20 PROCEDURE — 99385 PREV VISIT NEW AGE 18-39: CPT | Performed by: NURSE PRACTITIONER

## 2025-01-20 PROCEDURE — 3008F BODY MASS INDEX DOCD: CPT | Performed by: NURSE PRACTITIONER

## 2025-01-20 ASSESSMENT — ENCOUNTER SYMPTOMS
EYES NEGATIVE: 1
HEMATOLOGIC/LYMPHATIC NEGATIVE: 1
FACIAL ASYMMETRY: 0
NERVOUS/ANXIOUS: 0
ALLERGIC/IMMUNOLOGIC NEGATIVE: 1
CONFUSION: 0
ENDOCRINE NEGATIVE: 1
NEUROLOGICAL NEGATIVE: 1
LIGHT-HEADEDNESS: 0
WEAKNESS: 0
CONSTITUTIONAL NEGATIVE: 1
SLEEP DISTURBANCE: 0
CARDIOVASCULAR NEGATIVE: 1
DIZZINESS: 0
GASTROINTESTINAL NEGATIVE: 1
RESPIRATORY NEGATIVE: 1
POLYPHAGIA: 0
PSYCHIATRIC NEGATIVE: 1
MUSCULOSKELETAL NEGATIVE: 1
WOUND: 0

## 2025-01-20 ASSESSMENT — PATIENT HEALTH QUESTIONNAIRE - PHQ9
2. FEELING DOWN, DEPRESSED OR HOPELESS: NOT AT ALL
SUM OF ALL RESPONSES TO PHQ9 QUESTIONS 1 AND 2: 0
1. LITTLE INTEREST OR PLEASURE IN DOING THINGS: NOT AT ALL

## 2025-01-20 NOTE — PROGRESS NOTES
Subjective   Patient ID: Bridgett Ag is a 34 y.o. female who presents for [facility] ([facility], ).    HPI   Bridgett is a 35 yo AA female here to [facility].  Has not seen recent PCP in quite some times.  [place].  She is established with a GYN if she is trying to get pregnant.  She does have a history of a stillborn loss at 20 weeks pregnant.  Of note she was in ER several weeks ago for complaints of midsternal nonradiating chest pain with shortness of breath.  She does admit that she was feeling under weather at that time.  Chart review was completed by this NP including pertinent testing and lab results.  CAT scan showed  IMPRESSION:  1. Suspect multiple small probably inflammatory nodules bilaterally  some of which are stable since previous exam and some new since prior  study. Attention in subsequent follow-up studies recommended.  2. No CT evidence for acute pulmonary embolic disease, aortic  aneurysm or dissection.  3. Additional detailed nonacute findings as above.    Patient was noted with leukocytosis possible inflammatory changes on her CTA due to elevated D-dimer.  Negative for PE.  Patient is quite concerned with the lung nodules that were noted.  Will complete repeat CT scan in 6 months for further follow-up.    She is not a current smoker.  She denies any chest pain palpitations or shortness of breath.  No unintentional weight loss.  She has been working on diet and exercise and a holistic approach to weight loss options.  She was noted with ketones in her urinalysis that she was following a keto diet at the time.    She does take vitamins on a daily basis.  We will complete vitamin testing for follow-up and food allergy panel as patient has stated some foods to make her have bowel issues such as abdominal distention and increased gas  Past medical History:   20 week loss of child  2022.      Surgical History     · History of Colposcopy   ·  "History of Loop electrosurgical excision procedure     Social History     · Always uses seat belt   · Denied: History of domestic violence   ·    · Never a smoker   · No alcohol use   · No illicit drug use     Allergies  Review of Systems   Constitutional: Negative.    HENT: Negative.     Eyes: Negative.    Respiratory: Negative.     Cardiovascular: Negative.    Gastrointestinal: Negative.    Endocrine: Negative.  Negative for polyphagia.   Genitourinary: Negative.    Musculoskeletal: Negative.    Skin: Negative.  Negative for pallor, rash and wound.   Allergic/Immunologic: Negative.    Neurological: Negative.  Negative for dizziness, facial asymmetry, weakness and light-headedness.   Hematological: Negative.    Psychiatric/Behavioral: Negative.  Negative for confusion, sleep disturbance and suicidal ideas. The patient is not nervous/anxious.    All other systems reviewed and are negative.      Objective   /72   Pulse 83   Temp 36.2 °C (97.2 °F)   Ht 1.651 m (5' 5\")   Wt 108 kg (238 lb)   SpO2 96%   BMI 39.61 kg/m²     Physical Exam  Vitals and nursing note reviewed.   Constitutional:       Appearance: Normal appearance. She is normal weight.   HENT:      Head: Normocephalic.      Nose: Nose normal.      Mouth/Throat:      Mouth: Mucous membranes are moist.   Eyes:      Extraocular Movements: Extraocular movements intact.      Conjunctiva/sclera: Conjunctivae normal.      Pupils: Pupils are equal, round, and reactive to light.   Cardiovascular:      Rate and Rhythm: Normal rate and regular rhythm.      Pulses: Normal pulses.      Heart sounds: Normal heart sounds.   Pulmonary:      Effort: Pulmonary effort is normal.   Abdominal:      General: Abdomen is flat. Bowel sounds are normal.      Palpations: Abdomen is soft.   Musculoskeletal:         General: Normal range of motion.      Cervical back: Normal range of motion.   Skin:     General: Skin is warm and dry.   Neurological:      General: No " focal deficit present.      Mental Status: She is alert and oriented to person, place, and time.   Psychiatric:         Mood and Affect: Mood normal.         Behavior: Behavior normal.         Assessment/Plan   1. Annual physical exam (Primary)  - Hemoglobin A1C; Future  - CBC; Future  - Comprehensive Metabolic Panel; Future  - Lipid Panel; Future  - TSH with reflex to Free T4 if abnormal; Future  - Triiodothyronine, Total; Future  - Microscopic Only, Urine; Future    2. Vitamin deficiency  - Iron and TIBC; Future  - Ferritin; Future  - Vitamin D 25-Hydroxy,Total (for eval of Vitamin D levels); Future  - Vitamin B12; Future    3. Need for referral to nutritional services  - Referral to Nutrition Services; Future    4. Encounter for allergy testing  - Food Allergy Profile IgE; Future    5. Multiple lung nodules on CT  - CT chest wo IV contrast; Future

## 2025-02-19 ENCOUNTER — HOSPITAL ENCOUNTER (EMERGENCY)
Facility: HOSPITAL | Age: 35
Discharge: HOME | End: 2025-02-19
Payer: COMMERCIAL

## 2025-02-19 ENCOUNTER — APPOINTMENT (OUTPATIENT)
Dept: RADIOLOGY | Facility: HOSPITAL | Age: 35
End: 2025-02-19
Payer: COMMERCIAL

## 2025-02-19 VITALS
WEIGHT: 220 LBS | HEART RATE: 79 BPM | BODY MASS INDEX: 36.65 KG/M2 | RESPIRATION RATE: 17 BRPM | OXYGEN SATURATION: 99 % | SYSTOLIC BLOOD PRESSURE: 168 MMHG | HEIGHT: 65 IN | DIASTOLIC BLOOD PRESSURE: 111 MMHG | TEMPERATURE: 96 F

## 2025-02-19 DIAGNOSIS — M25.511 ACUTE PAIN OF RIGHT SHOULDER: Primary | ICD-10-CM

## 2025-02-19 PROCEDURE — 73030 X-RAY EXAM OF SHOULDER: CPT | Mod: RT

## 2025-02-19 PROCEDURE — 99284 EMERGENCY DEPT VISIT MOD MDM: CPT

## 2025-02-19 PROCEDURE — 2500000004 HC RX 250 GENERAL PHARMACY W/ HCPCS (ALT 636 FOR OP/ED)

## 2025-02-19 PROCEDURE — 73030 X-RAY EXAM OF SHOULDER: CPT | Mod: RIGHT SIDE | Performed by: RADIOLOGY

## 2025-02-19 PROCEDURE — 96372 THER/PROPH/DIAG INJ SC/IM: CPT

## 2025-02-19 PROCEDURE — 2500000001 HC RX 250 WO HCPCS SELF ADMINISTERED DRUGS (ALT 637 FOR MEDICARE OP)

## 2025-02-19 RX ORDER — KETOROLAC TROMETHAMINE 30 MG/ML
30 INJECTION, SOLUTION INTRAMUSCULAR; INTRAVENOUS ONCE
Status: COMPLETED | OUTPATIENT
Start: 2025-02-19 | End: 2025-02-19

## 2025-02-19 RX ORDER — CYCLOBENZAPRINE HCL 10 MG
10 TABLET ORAL 2 TIMES DAILY PRN
Qty: 20 TABLET | Refills: 0 | Status: SHIPPED | OUTPATIENT
Start: 2025-02-19 | End: 2025-03-01

## 2025-02-19 RX ORDER — LIDOCAINE 50 MG/G
1 PATCH TOPICAL DAILY
Qty: 5 PATCH | Refills: 0 | Status: SHIPPED | OUTPATIENT
Start: 2025-02-19

## 2025-02-19 RX ORDER — NAPROXEN 500 MG/1
500 TABLET ORAL
Qty: 30 TABLET | Refills: 0 | Status: SHIPPED | OUTPATIENT
Start: 2025-02-19 | End: 2025-03-06

## 2025-02-19 RX ORDER — CYCLOBENZAPRINE HCL 10 MG
10 TABLET ORAL ONCE
Status: COMPLETED | OUTPATIENT
Start: 2025-02-19 | End: 2025-02-19

## 2025-02-19 RX ADMIN — CYCLOBENZAPRINE 10 MG: 10 TABLET, FILM COATED ORAL at 09:51

## 2025-02-19 RX ADMIN — KETOROLAC TROMETHAMINE 30 MG: 30 INJECTION, SOLUTION INTRAMUSCULAR at 09:51

## 2025-02-19 ASSESSMENT — COLUMBIA-SUICIDE SEVERITY RATING SCALE - C-SSRS
2. HAVE YOU ACTUALLY HAD ANY THOUGHTS OF KILLING YOURSELF?: NO
1. IN THE PAST MONTH, HAVE YOU WISHED YOU WERE DEAD OR WISHED YOU COULD GO TO SLEEP AND NOT WAKE UP?: NO
6. HAVE YOU EVER DONE ANYTHING, STARTED TO DO ANYTHING, OR PREPARED TO DO ANYTHING TO END YOUR LIFE?: NO

## 2025-02-19 ASSESSMENT — PAIN DESCRIPTION - PAIN TYPE: TYPE: ACUTE PAIN

## 2025-02-19 ASSESSMENT — PAIN SCALES - GENERAL: PAINLEVEL_OUTOF10: 10 - WORST POSSIBLE PAIN

## 2025-02-19 ASSESSMENT — PAIN DESCRIPTION - ORIENTATION: ORIENTATION: RIGHT

## 2025-02-19 ASSESSMENT — PAIN - FUNCTIONAL ASSESSMENT: PAIN_FUNCTIONAL_ASSESSMENT: 0-10

## 2025-02-19 ASSESSMENT — PAIN DESCRIPTION - DESCRIPTORS: DESCRIPTORS: ACHING

## 2025-02-19 ASSESSMENT — PAIN DESCRIPTION - LOCATION: LOCATION: SHOULDER

## 2025-02-19 NOTE — ED TRIAGE NOTES
Pt arrives to ED with c/o right shoulder pain that started yesterday. Pt states she thinks she might have pulled a muscle. She was cleaning the house and states she was lifting heavy garage bags the day before. Pt has previous muscle strain in right shoulder. Pt states it feels very similar. Pt denies numbness/tingling.

## 2025-02-19 NOTE — ED PROVIDER NOTES
HPI   Chief Complaint   Patient presents with    Shoulder Pain       Patient is a 34-year-old female with benign past medical history presenting to the ED with concern for right-sided shoulder pain.  Patient states on Monday she was cleaning around her house and was carrying multiple large garbage bags in her right hand.  Tuesday evening she started to have some soreness in her right shoulder.  Pain is located on the lateral side, aching and sharp in nature, worse with certain movements, no radiation, intermittent only with movements, rated 7/10.  Denies any injury, numbness, weakness, tingling.  She is still able to move and use her right arm.  She is right-handed.  Denies any chest pain, cough, shortness of breath.  Denies any history of hypertension, hyperlipidemia, diabetes, or smoker.  She does have a family history of coronary disease in her father side.  Patient states she was seen in December for similar symptoms.  Was told she had a right-sided shoulder muscle strain and was given Toradol which helped with pain.        Patient History   Past Medical History:   Diagnosis Date    Encounter for screening for malignant neoplasm of cervix 02/22/2021    Cervical cancer screening    Vitamin D deficiency, unspecified 01/12/2021    Vitamin D deficiency     Past Surgical History:   Procedure Laterality Date    OTHER SURGICAL HISTORY  03/01/2022    Loop electrosurgical excision procedure    OTHER SURGICAL HISTORY  03/01/2022    Colposcopy     Family History   Problem Relation Name Age of Onset    Hypertension Mother      Diabetes Mother      Blood clot Father      Heart disease Father      Diabetes Father       Social History     Tobacco Use    Smoking status: Never    Smokeless tobacco: Never   Vaping Use    Vaping status: Never Used   Substance Use Topics    Alcohol use: Never    Drug use: Never       Physical Exam   ED Triage Vitals [02/19/25 0844]   Temperature Heart Rate Respirations BP   35.6 °C (96 °F) 79 17  (!) 168/111      Pulse Ox Temp Source Heart Rate Source Patient Position   99 % Temporal Monitor --      BP Location FiO2 (%)     -- --       Physical Exam  Constitutional:       General: She is not in acute distress.     Appearance: Normal appearance. She is not ill-appearing, toxic-appearing or diaphoretic.   Cardiovascular:      Rate and Rhythm: Normal rate and regular rhythm.      Pulses: Normal pulses.      Heart sounds: Normal heart sounds. No murmur heard.     No friction rub. No gallop.      Comments: Radial pulses 2+ bilaterally  Pulmonary:      Effort: Pulmonary effort is normal. No respiratory distress.      Breath sounds: Normal breath sounds. No stridor. No wheezing, rhonchi or rales.   Abdominal:      General: Abdomen is flat. Bowel sounds are normal. There is no distension.      Palpations: Abdomen is soft.      Tenderness: There is no abdominal tenderness.   Musculoskeletal:         General: No swelling. Normal range of motion.      Cervical back: Normal range of motion and neck supple. No rigidity or tenderness.      Comments: Limited range of motion in right shoulder secondary to pain.  Shoulder shrug 5/5.  Full range of motion bilateral elbow pronation/supination/flexion/extension.  Full range of motion to wrist flexion/extension/ulnar deviation/radial deviation bilaterally.   strength 5/5.  Sensation normal.  Positive Neer sign and Love sign on the right shoulder   Lymphadenopathy:      Cervical: No cervical adenopathy.   Skin:     Capillary Refill: Capillary refill takes less than 2 seconds.   Neurological:      General: No focal deficit present.      Mental Status: She is alert and oriented to person, place, and time.      Sensory: No sensory deficit.      Motor: No weakness.   Psychiatric:         Mood and Affect: Mood normal.         Behavior: Behavior normal.         ED Course & MDM   ED Course as of 02/19/25 1033   Wed Feb 19, 2025   0956 Patient is a 34-year-old female sent to the  ED with concern for right-sided shoulder pain.  Differentials include muscle sprain/strain, dislocation, fracture, cardiac etiologies, gallbladder etiologies.  Vital signs are stable patient nontoxic-appearing.  Patient does not have any cardiac risk factors aside from family history.  Discussed obtaining EKG with patient.  She is refusing having EKG done at this time.  Denies any GI symptoms I have low suspicion for biliary etiology.  Patient has tenderness to palpation over the AC joint on the right.  Positive Neer sign Love sign.  I suspect muscle strain versus rotator cuff injury.  X-ray of right shoulder obtained.  Patient states she had something similar in December and was given Toradol which helped with the pain.  Will give Toradol and Flexeril for pain control. [VS]   0957 I personally reviewed x-ray.  No obvious fracture or dislocation.  Will radiologist read. [VS]   0958 IMPRESSION:  No evidence for acute fracture, dislocation or osseous abnormality.  Signed by Shady Shearer MD [VS]   1030 X-ray negative for any acute process.  Patient states pain is controlled with Flexeril and Toradol.  Patient is appropriate for outpatient management.  Also prescription for Flexeril, naproxen, and lidocaine patches.  Referral placed for orthopedic surgery patient told she can follow-up with orthopedics for further management.  Patient told to return to ED for any new or worsening symptoms. [VS]      ED Course User Index  [VS] Courtney Rangel PA-C         Diagnoses as of 02/19/25 1033   Acute pain of right shoulder                 No data recorded     [place] Coma Scale Score: 15 (02/19/25 0844 : Nestor Stoddard RN)                     Medical Decision Making  Chronic Medical Conditions Significantly Affecting Care:      Escalation of Care: Appropriate for outpatient management      Counseling: Spoke with the patient and discussed today´s findings, in addition to providing specific details for the plan of  care and expected course.  Patient was given the opportunity to ask questions.    Discussed return precautions and importance of follow-up.  Advised to follow-up with orthopedics.  Advised to return to the ED for changing or worsening symptoms, new symptoms, complaint specific precautions, and precautions listed on the discharge paperwork.  Educated on the common potential side effects of medications prescribed.    I advised the patient that the emergency evaluation and treatment provided today doesn't end their need for medical care. It is very important that they follow-up with their primary care provider or other specialist as instructed.    The plan of care was mutually agreed upon with the patient. The patient and/or family were given the opportunity to ask questions. All questions asked today in the ED were answered to the best of my ability with today's information.    I specifically advised the patient to return to the ED for changing or worsening symptoms, worrisome new symptoms, or for any complaint specific precautions listed on the discharge paperwork.      Procedure  Procedures     Courtney Rangel PA-C  02/19/25 1037

## 2025-02-19 NOTE — DISCHARGE INSTRUCTIONS
Can take Flexeril 10 mg every 12 hours and naproxen 500 mg every 12 hours as needed for pain.  Can apply Lidoderm patch to right shoulder.  Referral placed with orthopedic surgery.  Please follow-up with orthopedic surgery for further management.  Return to ED for any new or worsening symptoms

## 2025-05-28 ENCOUNTER — TELEPHONE (OUTPATIENT)
Dept: OBSTETRICS AND GYNECOLOGY | Facility: CLINIC | Age: 35
End: 2025-05-28
Payer: COMMERCIAL

## 2025-08-07 ENCOUNTER — APPOINTMENT (OUTPATIENT)
Dept: PRIMARY CARE | Facility: CLINIC | Age: 35
End: 2025-08-07
Payer: COMMERCIAL

## 2025-08-07 ENCOUNTER — APPOINTMENT (OUTPATIENT)
Dept: RADIOLOGY | Facility: HOSPITAL | Age: 35
End: 2025-08-07
Payer: COMMERCIAL

## 2025-08-07 ENCOUNTER — HOSPITAL ENCOUNTER (EMERGENCY)
Facility: HOSPITAL | Age: 35
Discharge: HOME | End: 2025-08-07
Payer: COMMERCIAL

## 2025-08-07 VITALS
BODY MASS INDEX: 36.65 KG/M2 | OXYGEN SATURATION: 98 % | WEIGHT: 220 LBS | HEART RATE: 80 BPM | HEIGHT: 65 IN | SYSTOLIC BLOOD PRESSURE: 128 MMHG | TEMPERATURE: 98.4 F | RESPIRATION RATE: 18 BRPM | DIASTOLIC BLOOD PRESSURE: 88 MMHG

## 2025-08-07 DIAGNOSIS — L03.90 CELLULITIS, UNSPECIFIED CELLULITIS SITE: Primary | ICD-10-CM

## 2025-08-07 DIAGNOSIS — R59.9 ENLARGED LYMPH NODES: ICD-10-CM

## 2025-08-07 LAB
ALBUMIN SERPL BCP-MCNC: 3.9 G/DL (ref 3.4–5)
ALP SERPL-CCNC: 75 U/L (ref 33–110)
ALT SERPL W P-5'-P-CCNC: 13 U/L (ref 7–45)
ANION GAP SERPL CALC-SCNC: 12 MMOL/L (ref 10–20)
AST SERPL W P-5'-P-CCNC: 15 U/L (ref 9–39)
B-HCG SERPL-ACNC: <2 MIU/ML
BASOPHILS # BLD AUTO: 0.05 X10*3/UL (ref 0–0.1)
BASOPHILS NFR BLD AUTO: 0.4 %
BILIRUB SERPL-MCNC: 0.4 MG/DL (ref 0–1.2)
BUN SERPL-MCNC: 12 MG/DL (ref 6–23)
CALCIUM SERPL-MCNC: 8.9 MG/DL (ref 8.6–10.3)
CHLORIDE SERPL-SCNC: 102 MMOL/L (ref 98–107)
CO2 SERPL-SCNC: 24 MMOL/L (ref 21–32)
CREAT SERPL-MCNC: 0.83 MG/DL (ref 0.5–1.05)
EGFRCR SERPLBLD CKD-EPI 2021: >90 ML/MIN/1.73M*2
EOSINOPHIL # BLD AUTO: 0.13 X10*3/UL (ref 0–0.7)
EOSINOPHIL NFR BLD AUTO: 1 %
ERYTHROCYTE [DISTWIDTH] IN BLOOD BY AUTOMATED COUNT: 13.9 % (ref 11.5–14.5)
GLUCOSE SERPL-MCNC: 109 MG/DL (ref 74–99)
HCT VFR BLD AUTO: 45.1 % (ref 36–46)
HGB BLD-MCNC: 13.8 G/DL (ref 12–16)
IMM GRANULOCYTES # BLD AUTO: 0.03 X10*3/UL (ref 0–0.7)
IMM GRANULOCYTES NFR BLD AUTO: 0.2 % (ref 0–0.9)
LYMPHOCYTES # BLD AUTO: 3.33 X10*3/UL (ref 1.2–4.8)
LYMPHOCYTES NFR BLD AUTO: 26.3 %
MCH RBC QN AUTO: 25.7 PG (ref 26–34)
MCHC RBC AUTO-ENTMCNC: 30.6 G/DL (ref 32–36)
MCV RBC AUTO: 84 FL (ref 80–100)
MONOCYTES # BLD AUTO: 1.22 X10*3/UL (ref 0.1–1)
MONOCYTES NFR BLD AUTO: 9.7 %
NEUTROPHILS # BLD AUTO: 7.88 X10*3/UL (ref 1.2–7.7)
NEUTROPHILS NFR BLD AUTO: 62.4 %
NRBC BLD-RTO: 0 /100 WBCS (ref 0–0)
PLATELET # BLD AUTO: 377 X10*3/UL (ref 150–450)
POTASSIUM SERPL-SCNC: 4.8 MMOL/L (ref 3.5–5.3)
PROT SERPL-MCNC: 7.7 G/DL (ref 6.4–8.2)
RBC # BLD AUTO: 5.36 X10*6/UL (ref 4–5.2)
SODIUM SERPL-SCNC: 133 MMOL/L (ref 136–145)
WBC # BLD AUTO: 12.6 X10*3/UL (ref 4.4–11.3)

## 2025-08-07 PROCEDURE — 73610 X-RAY EXAM OF ANKLE: CPT | Mod: RT

## 2025-08-07 PROCEDURE — 2500000004 HC RX 250 GENERAL PHARMACY W/ HCPCS (ALT 636 FOR OP/ED)

## 2025-08-07 PROCEDURE — 84702 CHORIONIC GONADOTROPIN TEST: CPT

## 2025-08-07 PROCEDURE — 96365 THER/PROPH/DIAG IV INF INIT: CPT

## 2025-08-07 PROCEDURE — 2500000001 HC RX 250 WO HCPCS SELF ADMINISTERED DRUGS (ALT 637 FOR MEDICARE OP)

## 2025-08-07 PROCEDURE — 85025 COMPLETE CBC W/AUTO DIFF WBC: CPT

## 2025-08-07 PROCEDURE — 73610 X-RAY EXAM OF ANKLE: CPT | Mod: RIGHT SIDE | Performed by: RADIOLOGY

## 2025-08-07 PROCEDURE — 93971 EXTREMITY STUDY: CPT

## 2025-08-07 PROCEDURE — 99285 EMERGENCY DEPT VISIT HI MDM: CPT | Mod: 25

## 2025-08-07 PROCEDURE — 80053 COMPREHEN METABOLIC PANEL: CPT

## 2025-08-07 RX ORDER — NAPROXEN 500 MG/1
500 TABLET ORAL EVERY 12 HOURS
Qty: 6 TABLET | Refills: 0 | Status: SHIPPED | OUTPATIENT
Start: 2025-08-07 | End: 2025-08-10

## 2025-08-07 RX ORDER — MUPIROCIN 20 MG/G
OINTMENT TOPICAL
Qty: 15 G | Refills: 0 | Status: SHIPPED | OUTPATIENT
Start: 2025-08-07 | End: 2025-08-17

## 2025-08-07 RX ORDER — DOXYCYCLINE HYCLATE 100 MG
100 TABLET ORAL ONCE
Status: COMPLETED | OUTPATIENT
Start: 2025-08-07 | End: 2025-08-07

## 2025-08-07 RX ORDER — CEPHALEXIN 500 MG/1
500 CAPSULE ORAL 4 TIMES DAILY
Qty: 28 CAPSULE | Refills: 0 | Status: SHIPPED | OUTPATIENT
Start: 2025-08-07 | End: 2025-08-14

## 2025-08-07 RX ORDER — OXYCODONE AND ACETAMINOPHEN 5; 325 MG/1; MG/1
1 TABLET ORAL ONCE
Refills: 0 | Status: COMPLETED | OUTPATIENT
Start: 2025-08-07 | End: 2025-08-07

## 2025-08-07 RX ORDER — CEFAZOLIN SODIUM 2 G/50ML
2 SOLUTION INTRAVENOUS ONCE
Status: COMPLETED | OUTPATIENT
Start: 2025-08-07 | End: 2025-08-07

## 2025-08-07 RX ORDER — ACETAMINOPHEN 500 MG
1000 TABLET ORAL EVERY 8 HOURS PRN
Qty: 18 TABLET | Refills: 0 | Status: SHIPPED | OUTPATIENT
Start: 2025-08-07 | End: 2025-08-10

## 2025-08-07 RX ORDER — DOXYCYCLINE 100 MG/1
100 CAPSULE ORAL 2 TIMES DAILY
Qty: 14 CAPSULE | Refills: 0 | Status: SHIPPED | OUTPATIENT
Start: 2025-08-07 | End: 2025-08-14

## 2025-08-07 RX ADMIN — DOXYCYCLINE HYCLATE 100 MG: 100 TABLET, COATED ORAL at 10:57

## 2025-08-07 RX ADMIN — OXYCODONE HYDROCHLORIDE AND ACETAMINOPHEN 1 TABLET: 5; 325 TABLET ORAL at 09:34

## 2025-08-07 RX ADMIN — CEFAZOLIN SODIUM 2 G: 2 SOLUTION INTRAVENOUS at 10:20

## 2025-08-07 ASSESSMENT — PAIN SCALES - GENERAL: PAINLEVEL_OUTOF10: 10 - WORST POSSIBLE PAIN

## 2025-08-07 ASSESSMENT — PAIN DESCRIPTION - LOCATION: LOCATION: ANKLE

## 2025-08-07 ASSESSMENT — PAIN - FUNCTIONAL ASSESSMENT: PAIN_FUNCTIONAL_ASSESSMENT: 0-10

## 2025-08-07 NOTE — ED TRIAGE NOTES
Right ankle swelling x2 days ago. Denies trauma or injury. Patient states she has an hard time bearing weight on right leg. Warmth and redness noted.

## 2025-08-07 NOTE — ED PROVIDER NOTES
HPI   Chief Complaint   Patient presents with    Ankle Pain       35-year-old female otherwise healthy presents to the ED today with a chief concern of right ankle pain and swelling.  Patient reports symptoms started 2 days ago.  No known trauma or injury to the area.  Patient reports that she initially noticed the symptoms when she was lying in bed.  She felt a stabbing sensation in her right medial ankle and was concerned that she may have gotten bit by something.  She did not actually see anything bite her.  She reports that since then she is noticed increasing redness and swelling around the area that has started coming up towards the calf.  She describes the pain as aching.  It is worse with ambulation.  She felt warm yesterday but never actually measured her temperature.  No nausea or vomiting.  Denies chance of pregnancy.  No chest pain or shortness of breath.  No history of PE or DVT.  No recent travel or surgeries.  She did put some toothpaste on the area.  No recorded fevers.  Denies any pain in the knee.  She does not have any difficulty moving the ankle.  She has no other symptoms or concerns at this time.      History provided by:  Patient   used: No            Patient History   Medical History[1]  Surgical History[2]  Family History[3]  Social History[4]    Physical Exam   ED Triage Vitals [08/07/25 0806]   Temperature Heart Rate Respirations BP   36.9 °C (98.4 °F) 85 16 (!) 131/91      Pulse Ox Temp Source Heart Rate Source Patient Position   98 % Oral -- --      BP Location FiO2 (%)     -- --       Physical Exam    Musculoskeletal:        Legs:       Comments: Location of erythema       Constitutional: Vital signs per nursing notes.  Well developed, well nourished.  No acute distress.    Psychiatric: alert and oriented to person, place, and time; no abnormalities of mood or affect; memory intact  Eyes: conjunctivae and lids normal  ENT: Ears normal externally; face symmetric.  voice normal  Neck: neck supple, no meningismus; trachea midline without deviation  Respiratory: normal respiratory effort and excursion; no rales, rhonchi, or wheezes; equal air entry  Cardiovascular: RRR, 2+ pulses extremities   Neurological: normal speech; CN II-XII grossly intact, normal motor and sensory function  GI: no distention, soft, nontender  : Deferred  Musculoskeletal: ambulates with pain; small amount of erythema noted to the medial aspect of the right ankle.  Full range of motion of the right ankle no focal bony tenderness.  There is tenderness over the medial malleolus but no tenderness over base of fifth metatarsal, nodular, or lateral malleolus.  5/5 strength in lower extremities bilaterally.  Sensation intact in lower extremities bilaterally.  2+ symmetric dorsalis pedis and posterior tibial pulses.  Compartments are soft.  No cyanosis.  No pain with passive range of motion of the right ankle.  The right Achilles tendon is intact.  Skin: Erythema noted to the right medial ankle and distal tib-fib as noted in photo above.  No crepitus.  Compartments are soft.  No lymphangitic streaking.      ED Course & MDM   ED Course as of 08/07/25 1257   Thu Aug 07, 2025   0900 Patient has a ride home today will not be driving herself home [MC]   1003 Spoke with orthopedics Dr. Ramsey regarding the possible subacute fracture.  States that have no tenderness to the lateral foot second ankle no need to immobilize or get further imaging.  Okay for weightbearing as tolerated.     [MC]   1030 I personally interpreted the labs.  CBC shows mild leukocytosis with blood cell count 12.6.  CMP shows normal kidney and liver function.  hCG negative [MC]   1037 IMPRESSION:  No sonographic evidence for deep vein thrombosis within the evaluated  veins of the right lower extremity. The calf veins were suboptimally  visualized.      Prominent lymph nodes in the right groin, as detailed above.      MACRO:  None      Signed by:  Rick Mak 8/7/2025 9:36 AM  Dictation workstation:   [id number]   []      ED Course User Index  [] Wilbert Singh PA-C         Diagnoses as of 08/07/25 1257   Cellulitis, unspecified cellulitis site   Enlarged lymph nodes                 No data recorded     [place] Coma Scale Score: 15 (08/07/25 1026 : Armida Dick RN)                           Medical Decision Making  35-year-old female otherwise healthy presents to the ED today with a chief concern of right ankle pain and swelling.  Vital signs are reassuring.  Patient overall appears well and is nontoxic-appearing.Patient has full range of motion of the neck without meningismus.  Satting well on room air.  Not hypoxic.  Not tachycardic.  Afebrile.  She has full range of motion of the right ankle no with passive range of motion.  Low suspicion for septic joint at this time.  She is able to ambulate but does ambulate with a limp.  X-ray shows subacute to remote appearing fracture to the anterior process of calcaneus.  Moderate medial malleoli are soft tissue edema without underlying acute osseous abnormality.  No effusion noted on x-ray.  Patient does report that she injured her ankle years ago.  I spoke with orthopedics who did not think further imaging or immobilization was indicated at this time.  Ultrasound of the right lower extremity shows no DVT but does show prominent lymph nodes in the groin consistent with her cellulitis.  Her labs are reassuring.  Has mild white count of 12.6 with slight left shift.  Normal kidney and liver function.  hCG negative.  She was given Ancef 2 g IV in the ED.  Was also given doxycycline and Percocet.  She does have a ride home today will not be driving herself home.  Neurovascular intact in lower extremities bilaterally.  Compartments are soft.  There is no concern for compartment syndrome.  Low suspicion for septic joint or gout at this time.  Do not think further workup with arthrocentesis is indicated at  this time.  Symptoms likely related to cellulitis.  Will treat with doxycycline and Keflex outpatient.  Discussed impression of findings with patient and she feels comfortable returning home.  We discussed very strict return precautions including returning for any new or worsening symptoms.  Patient is in agreement with this plan.  She will follow-up with the PCP within 3 days.  Patient understands signs and symptoms of septic joint.  She understands she may need to come back for admission and IV antibiotics if symptoms worsen.    Differential diagnosis: Cellulitis, ligamentous injury, fracture, dislocation, abscess, lymphangitis, DVT, acute arterial occlusion, necrotizing fasciitis, arthritis, crystal arthropathy, septic arthritis, tendon rupture    Disposition/treatment  1.  See diagnosis    Shared decision-making was used patient feels comfortable returning home     Patient was advised to follow up with recommended provider in 1 day for another evaluation and exam. I advised patient/guardian to return or go to closest emergency room immediately if symptoms change, get worse, new symptoms develop prior to follow up. If there is no improvement in symptoms in the next 24 hours they are advised to return for further evaluation and exam. I also explained the plan and treatment course. Patient/guardian is in agreement with plan, treatment course, and follow up and states verbally that they will comply.    Patient is homegoing. I discussed the differential; results and discharge plan with the patient and/or family/friend/caregiver if present.  I emphasized the importance of follow-up with the physician I referred them to in the timeframe recommended.  I explained reasons for the patient to return to the Emergency Department. They agreed that if they feel their condition is worsening or if they have any other concern they should call 911 immediately for further assistance. I gave the patient an opportunity to ask all  questions they had and answered all of them accordingly. They understand return precautions and discharge instructions. The patient and/or family/friend/caregiver expressed understanding verbally and that they would comply.        This note has been transcribed using voice recognition and may contain grammatical errors, misplaced words, incorrect words, incorrect phrases or other errors.        Procedure  Procedures         [1]   Past Medical History:  Diagnosis Date    Encounter for screening for malignant neoplasm of cervix 02/22/2021    Cervical cancer screening    Vitamin D deficiency, unspecified 01/12/2021    Vitamin D deficiency   [2]   Past Surgical History:  Procedure Laterality Date    OTHER SURGICAL HISTORY  03/01/2022    Loop electrosurgical excision procedure    OTHER SURGICAL HISTORY  03/01/2022    Colposcopy   [3]   Family History  Problem Relation Name Age of Onset    Hypertension Mother      Diabetes Mother      Blood clot Father      Heart disease Father      Diabetes Father     [4]   Social History  Tobacco Use    Smoking status: Never    Smokeless tobacco: Never   Vaping Use    Vaping status: Never Used   Substance Use Topics    Alcohol use: Never    Drug use: Never        Wilbert Singh PA-C  08/07/25 1257

## 2025-08-19 ENCOUNTER — APPOINTMENT (OUTPATIENT)
Dept: PRIMARY CARE | Facility: CLINIC | Age: 35
End: 2025-08-19
Payer: COMMERCIAL

## 2025-08-25 ENCOUNTER — APPOINTMENT (OUTPATIENT)
Dept: OBSTETRICS AND GYNECOLOGY | Facility: CLINIC | Age: 35
End: 2025-08-25
Payer: COMMERCIAL

## 2025-08-25 VITALS
DIASTOLIC BLOOD PRESSURE: 84 MMHG | HEIGHT: 65 IN | SYSTOLIC BLOOD PRESSURE: 126 MMHG | BODY MASS INDEX: 37.82 KG/M2 | WEIGHT: 227 LBS

## 2025-08-25 DIAGNOSIS — Z86.19 HISTORY OF HPV INFECTION: ICD-10-CM

## 2025-08-25 DIAGNOSIS — N97.9 INFERTILITY, FEMALE: Primary | ICD-10-CM

## 2025-08-25 DIAGNOSIS — N89.8 VAGINAL ITCHING: ICD-10-CM

## 2025-08-25 DIAGNOSIS — L70.9 ACNE, UNSPECIFIED ACNE TYPE: ICD-10-CM

## 2025-08-25 DIAGNOSIS — Z01.419 WELL WOMAN EXAM WITH ROUTINE GYNECOLOGICAL EXAM: ICD-10-CM

## 2025-08-25 PROCEDURE — 1036F TOBACCO NON-USER: CPT | Performed by: OBSTETRICS & GYNECOLOGY

## 2025-08-25 PROCEDURE — 99395 PREV VISIT EST AGE 18-39: CPT | Performed by: OBSTETRICS & GYNECOLOGY

## 2025-08-25 PROCEDURE — 3008F BODY MASS INDEX DOCD: CPT | Performed by: OBSTETRICS & GYNECOLOGY

## 2025-08-25 RX ORDER — BISMUTH SUBSALICYLATE 262 MG
1 TABLET,CHEWABLE ORAL DAILY
COMMUNITY

## 2025-08-27 LAB — BV SCORE VAG QL: NORMAL

## 2025-09-04 ENCOUNTER — APPOINTMENT (OUTPATIENT)
Dept: PRIMARY CARE | Facility: CLINIC | Age: 35
End: 2025-09-04
Payer: COMMERCIAL

## 2025-09-04 PROBLEM — E55.9 VITAMIN D DEFICIENCY: Status: ACTIVE | Noted: 2025-09-04

## 2025-09-04 PROBLEM — R73.03 PRE-DIABETES: Status: ACTIVE | Noted: 2025-09-04

## 2025-09-05 ASSESSMENT — ENCOUNTER SYMPTOMS
CONSTIPATION: 0
NAUSEA: 0
FEVER: 0
COUGH: 0
SLEEP DISTURBANCE: 0
PALPITATIONS: 0
STRIDOR: 0
WHEEZING: 0
TROUBLE SWALLOWING: 0
SINUS PAIN: 0
SHORTNESS OF BREATH: 0
DIZZINESS: 0
ABDOMINAL PAIN: 0
FREQUENCY: 0
JOINT SWELLING: 0
VOMITING: 0
DYSURIA: 0
HALLUCINATIONS: 0
NECK PAIN: 0
FACIAL ASYMMETRY: 0
NUMBNESS: 0
PHOTOPHOBIA: 0
MYALGIAS: 0
TREMORS: 0
CHEST TIGHTNESS: 0
CONFUSION: 0
BACK PAIN: 0
UNEXPECTED WEIGHT CHANGE: 0
WEAKNESS: 0
ADENOPATHY: 0
WOUND: 0
VOICE CHANGE: 0
SEIZURES: 0
ACTIVITY CHANGE: 0
COLOR CHANGE: 0
DIARRHEA: 0
EYE PAIN: 0
HEADACHES: 0
FLANK PAIN: 0
DECREASED CONCENTRATION: 0
FATIGUE: 0
ARTHRALGIAS: 0
APPETITE CHANGE: 0
SORE THROAT: 0
SPEECH DIFFICULTY: 0
HEMATURIA: 0
NERVOUS/ANXIOUS: 0

## 2025-09-06 ASSESSMENT — ANXIETY QUESTIONNAIRES
3. WORRYING TOO MUCH ABOUT DIFFERENT THINGS: NOT AT ALL
GAD7 TOTAL SCORE: 0
5. BEING SO RESTLESS THAT IT IS HARD TO SIT STILL: NOT AT ALL
1. FEELING NERVOUS, ANXIOUS, OR ON EDGE: NOT AT ALL
4. TROUBLE RELAXING: NOT AT ALL
6. BECOMING EASILY ANNOYED OR IRRITABLE: NOT AT ALL
2. NOT BEING ABLE TO STOP OR CONTROL WORRYING: NOT AT ALL
7. FEELING AFRAID AS IF SOMETHING AWFUL MIGHT HAPPEN: NOT AT ALL

## 2025-09-06 ASSESSMENT — PATIENT HEALTH QUESTIONNAIRE - PHQ9
SUM OF ALL RESPONSES TO PHQ9 QUESTIONS 1 AND 2: 0
2. FEELING DOWN, DEPRESSED OR HOPELESS: NOT AT ALL
1. LITTLE INTEREST OR PLEASURE IN DOING THINGS: NOT AT ALL

## 2025-10-16 ENCOUNTER — APPOINTMENT (OUTPATIENT)
Dept: PRIMARY CARE | Facility: CLINIC | Age: 35
End: 2025-10-16
Payer: COMMERCIAL